# Patient Record
Sex: MALE | Race: WHITE | NOT HISPANIC OR LATINO | URBAN - METROPOLITAN AREA
[De-identification: names, ages, dates, MRNs, and addresses within clinical notes are randomized per-mention and may not be internally consistent; named-entity substitution may affect disease eponyms.]

---

## 2018-02-23 ENCOUNTER — EMERGENCY (EMERGENCY)
Facility: HOSPITAL | Age: 23
LOS: 1 days | Discharge: HOME | End: 2018-02-23
Admitting: EMERGENCY MEDICINE
Payer: SELF-PAY

## 2018-02-23 VITALS
RESPIRATION RATE: 18 BRPM | SYSTOLIC BLOOD PRESSURE: 128 MMHG | HEART RATE: 102 BPM | OXYGEN SATURATION: 98 % | DIASTOLIC BLOOD PRESSURE: 88 MMHG | TEMPERATURE: 99 F

## 2018-02-23 DIAGNOSIS — T40.1X1A POISONING BY HEROIN, ACCIDENTAL (UNINTENTIONAL), INITIAL ENCOUNTER: ICD-10-CM

## 2018-02-23 DIAGNOSIS — R41.82 ALTERED MENTAL STATUS, UNSPECIFIED: ICD-10-CM

## 2018-02-23 DIAGNOSIS — F17.200 NICOTINE DEPENDENCE, UNSPECIFIED, UNCOMPLICATED: ICD-10-CM

## 2018-02-23 DIAGNOSIS — Z88.1 ALLERGY STATUS TO OTHER ANTIBIOTIC AGENTS STATUS: ICD-10-CM

## 2018-02-23 PROCEDURE — 99284 EMERGENCY DEPT VISIT MOD MDM: CPT | Mod: 25

## 2018-02-23 PROCEDURE — 99053 MED SERV 10PM-8AM 24 HR FAC: CPT

## 2018-02-23 NOTE — ED PROVIDER NOTE - PHYSICAL EXAMINATION
Gen - Unkempt M, no acute distress, arousable to verbal stimuli   Skin - warm, dry, intact  HEENT - AT/NC, PERRL, mild conjunctival injection, o/p clear, uvula midline, airway patent, neck supple with no step off or midline tenderness, FROM   CV - S1S2, R/R/R  Resp - respiration non-labored, CTAB, symmetric bs b/l, no r/r/w  GI - NABS, soft, ND, NT, no rebound or guarding, no CVAT b/l  MS - w/w/p, no c/c/e, calves supple and NT  Neuro - Alert and awake, oriented x 3, clear speech, ambulatory steady gait, no focal deficits

## 2018-02-23 NOTE — ED PROVIDER NOTE - MEDICAL DECISION MAKING DETAILS
pt here for AMS 2/2 heroin OD, given IN narcan by EMS with appropriate response, monitored in the ED with improved mental status, AFVSS, currently ambulatory with steady gait, tolerating PO without N/V, clear speech, medically stable for d/c.

## 2018-02-23 NOTE — ED PROVIDER NOTE - OBJECTIVE STATEMENT
21 yo M with PMHx of polysubstance abuse, BIBA for AMS. Pt was found altered and difficult to arouse in [place] bathroom.  Bystander called EMS and was given IN Narcan with appropriate response.  Pt admits to shooting up one bag of heroin tonight.  Denies trauma, fall, HA, dizziness, bleeding, N/V/D/C, CP, SOB, palpitations, tremors, change in urinary/bowel function, and abdominal pain. Denies alcohol and other illicit drug use

## 2018-02-23 NOTE — ED ADULT TRIAGE NOTE - CHIEF COMPLAINT QUOTE
Patient complaining of overdose; as per EMS, patient was found in bathroom by PD at [facility] and give 8mg of narcan intranasally; patient is now A & O x4 ambulating with steady gait and no complaints; states he used one bag of heroin IV

## 2018-02-23 NOTE — ED ADULT NURSE NOTE - CHIEF COMPLAINT QUOTE
Patient complaining of overdose; as per EMS, patient was found in bathroom by PD at [facility] and give 8mg of narcan intranasally; patient is now A & O x4 ambulating with steady gait and no complaints; states he used one bag of heroin IV

## 2025-01-08 NOTE — ED ADULT TRIAGE NOTE - TEMPERATURE IN CELSIUS (DEGREES C)
No protocol for requested medication.    Medication: VALACYCLOVIR 1000 MG TABS; ONE TABLET DAILY AS NEEDED FOR COLD SORE;last filled 6/3/24 for 14 tabs/no refill  Last office visit date: 10/24;no future appointment scheduled  Pharmacy: [id number] [place] [id number] [address] AT [facility] [address]    Order pended, routed to clinician for review.    
37